# Patient Record
Sex: MALE | Race: WHITE
[De-identification: names, ages, dates, MRNs, and addresses within clinical notes are randomized per-mention and may not be internally consistent; named-entity substitution may affect disease eponyms.]

---

## 2018-08-13 ENCOUNTER — HOSPITAL ENCOUNTER (OUTPATIENT)
Dept: HOSPITAL 74 - JASU-SURG | Age: 48
Discharge: HOME | End: 2018-08-13
Attending: SURGERY
Payer: COMMERCIAL

## 2018-08-13 VITALS — SYSTOLIC BLOOD PRESSURE: 148 MMHG | HEART RATE: 67 BPM | DIASTOLIC BLOOD PRESSURE: 66 MMHG

## 2018-08-13 VITALS — BODY MASS INDEX: 30.2 KG/M2

## 2018-08-13 VITALS — TEMPERATURE: 98 F

## 2018-08-13 DIAGNOSIS — K42.9: Primary | ICD-10-CM

## 2018-08-13 PROCEDURE — 0WQF0ZZ REPAIR ABDOMINAL WALL, OPEN APPROACH: ICD-10-PCS | Performed by: SURGERY

## 2018-08-13 NOTE — HP
History & Physical Update





- History


History: No Change





- Physical


Physical: No Change





- Assessment


Assessment: No Change





- Plan


Plan: No Change (Recurrebt umbilical hernia for repair; r/b/t/a's d/w him pre-

op and informed consent obtained.)

## 2018-08-14 NOTE — PATH
Surgical Pathology Report



Patient Name:  CHRIS RASMUSSEN

Accession #:  [id number]

Med. Rec. #:  [id number]                                                        

   /Age/Gender:  1970 (Age: 48) / M

Account:  [id number]                                                          

             Location: [facility] SURGICAL

Taken:  2018

Received:  2018

Reported:  2018

Physicians:  Bubba Whiteside MD

  



Specimen(s) Received

 HERNIA SAC AND FAT 





Clinical History

Recurrent umbilical hernia







Final Diagnosis

HERNIA SAC AND FAT, REPAIR:

HERNIA SAC AND ADIPOSE TISSUE.





***Electronically Signed***

Gay De La Fuente M.D.





Gross Description

Received in formalin labeled "hernia sac and fat," is a 4.0 x 3.1 x 1.0 cm

portion of tan-pink fibromembranous tissue with attached fat, consistent with a

hernia sac. Representative sections are submitted in one cassette.

/2018

## 2018-08-22 NOTE — OP
DATE OF OPERATION:  08/13/2018

 

PREOPERATIVE DIAGNOSIS:  Recurrent umbilical hernia.

 

POSTOPERATIVE DIAGNOSIS:  Recurrent umbilical hernia.

 

PROCEDURE:  Repair recurrent umbilical hernia.

 

SURGEON:  Bubba Whiteside MD

 

ASSISTANT:  MANDO Gorman

 

ANESTHESIA:  General.

 

OPERATIVE FINDINGS:  There was a recurrent umbilical hernia with a fascial 
defect of

approximately 2.5 cm.  There was no evidence of any suture material from the 
previous

repair.  The rest of the findings were unremarkable.

 

PROCEDURE:  The patient was placed on the operating table in a supine position. 

After the induction of general anesthesia, patient's abdomen was prepped with

ChloraPrep and draped in sterile fashion.  A timeout was taken and the operative

field infiltrated with 1% xylocaine and 0.5% Marcaine in equal concentration. 

Infraumbilical skin incision crease was made from the 3 to 9 o'clock position 
using a

scalpel.  This was taken down through skin and subcutaneous tissue to the 
abdominal

wall.  The umbilical stalk was bluntly encircled, and the previously noted 
findings

were observed.  The umbilicus was dissected off the sac which was entered.  A

redundant sac and adherent preperitoneal fat were excised using electrocautery 
and

sent for pathological examination.  Hernia repair was carried out using multiple

interrupted 0 TiCron horizontal mattress sutures.  Irrigation was carried out 
with

normal saline, and then hemostasis checked for and noted to be good.  The 
operative

field was further infiltrated with additional local anesthesia, and then the

umbilicus was tacked down to the abdominal wall with interrupted 2-0 Vicryl.  
The

deep dermis was reapproximated with interrupted 3-0 Vicryl and the skin edges 
with

4-0 Monocryl in the subcuticular continuous fashion.  Steri-Strips, dry sterile

dressing, and a Tegaderm were placed and the procedure terminated at this 
point.  The

patient was aroused from general anesthesia and transferred to the 
postanesthesia

care unit in stable condition awake and alert.

 

ESTIMATED BLOOD LOSS:  Approximately 10 mL.

 

REPLACEMENTS:  Crystalloids.

 

DRAINS:  None.

 

SPECIMENS:  Hernia sac and fat to Pathology.

 

Bubba ZAVALA, was physically present in the operating room from the time 
the

patient was placed on the operating table until he was transferred to the

postanesthesia care unit in "[employer].

 

 

MD LAURA Haley/[id number]

DD: 08/22/2018 09:43

DT: 08/22/2018 11:21

Job #:  [id number]

MTDD

## 2020-08-14 ENCOUNTER — HOSPITAL ENCOUNTER (EMERGENCY)
Dept: HOSPITAL 74 - JER | Age: 50
Discharge: HOME | End: 2020-08-14
Payer: COMMERCIAL

## 2020-08-14 VITALS — SYSTOLIC BLOOD PRESSURE: 160 MMHG | DIASTOLIC BLOOD PRESSURE: 90 MMHG | HEART RATE: 82 BPM

## 2020-08-14 VITALS — BODY MASS INDEX: 30.9 KG/M2

## 2020-08-14 VITALS — TEMPERATURE: 97.7 F

## 2020-08-14 DIAGNOSIS — I10: Primary | ICD-10-CM

## 2020-08-14 LAB
ALBUMIN SERPL-MCNC: 4 G/DL (ref 3.4–5)
ALP SERPL-CCNC: 74 U/L (ref 45–117)
ALT SERPL-CCNC: 39 U/L (ref 13–61)
ANION GAP SERPL CALC-SCNC: 9 MMOL/L (ref 8–16)
AST SERPL-CCNC: 28 U/L (ref 15–37)
BASOPHILS # BLD: 0.5 % (ref 0–2)
BILIRUB SERPL-MCNC: 0.8 MG/DL (ref 0.2–1)
BUN SERPL-MCNC: 21.1 MG/DL (ref 7–18)
CALCIUM SERPL-MCNC: 9.3 MG/DL (ref 8.5–10.1)
CHLORIDE SERPL-SCNC: 105 MMOL/L (ref 98–107)
CO2 SERPL-SCNC: 25 MMOL/L (ref 21–32)
CREAT SERPL-MCNC: 1.5 MG/DL (ref 0.55–1.3)
DEPRECATED RDW RBC AUTO: 13.8 % (ref 11.9–15.9)
EOSINOPHIL # BLD: 0.6 % (ref 0–4.5)
GLUCOSE SERPL-MCNC: 98 MG/DL (ref 74–106)
HCT VFR BLD CALC: 49 % (ref 35.4–49)
HGB BLD-MCNC: 16.8 GM/DL (ref 11.7–16.9)
LYMPHOCYTES # BLD: 32.8 % (ref 8–40)
MCH RBC QN AUTO: 30.9 PG (ref 25.7–33.7)
MCHC RBC AUTO-ENTMCNC: 34.3 G/DL (ref 32–35.9)
MCV RBC: 90.1 FL (ref 80–96)
MONOCYTES # BLD AUTO: 8.7 % (ref 3.8–10.2)
NEUTROPHILS # BLD: 57.4 % (ref 42.8–82.8)
PLATELET # BLD AUTO: 164 K/MM3 (ref 134–434)
PMV BLD: 9.8 FL (ref 7.5–11.1)
POTASSIUM SERPLBLD-SCNC: 4.1 MMOL/L (ref 3.5–5.1)
PROT SERPL-MCNC: 7.6 G/DL (ref 6.4–8.2)
RBC # BLD AUTO: 5.44 M/MM3 (ref 4–5.6)
SODIUM SERPL-SCNC: 140 MMOL/L (ref 136–145)
WBC # BLD AUTO: 6.9 K/MM3 (ref 4–10)

## 2020-08-14 NOTE — PDOC
History of Present Illness





- General


Chief Complaint: Blood Pressure Problem


Stated Complaint: HIGH BLOOD PRESSURE


Time Seen by Provider: 08/14/20 12:46


History Source: Patient


Exam Limitations: No Limitations





- History of Present Illness


Initial Comments: 





08/14/20 12:56


50yM w PMHx HTN and umbilical hernia presenting w 10d persistent HTN and 

intermittent lightheadedness. Last few BPs at home 140//100. Current PCP 

told pt to take lisinopril in evening but didn't increase dose or add any meds 

despite repeated office visits w HTN. Trialed another medication, stopped d/t 

leg swelling and nausea side effects. Not compliant w home lisinopril. Denies 

fever, headache, vision change, n/v, chest pain, SOB. 





Not satisfied with PCP, wants new doc.





Past History





- Medical History


Allergies/Adverse Reactions: 


                                    Allergies











Allergy/AdvReac Type Severity Reaction Status Date / Time


 


fish derived Allergy  Hives Verified 08/13/18 10:02


 


No Known Drug Allergies Allergy   Verified 08/13/18 10:02


 


shellfish derived Allergy  Hives Verified 08/13/18 10:02











Home Medications: 


Ambulatory Orders





Lisinopril 10 mg PO DAILY 08/13/18 








Anemia: No


Asthma: No


Cancer: No


Cardiac Disorders: No


CVA: No


COPD: No


CHF: No


Dementia: No


Diabetes: No


GI Disorders: Yes (DIVERTICULITIS)


 Disorders: No


HTN: Yes


Hypercholesterolemia: No


Liver Disease: No


Seizures: No


Thyroid Disease: No





- Surgical History


Abdominal Surgery: Yes (LEFT HERNIA REPAIR 2X,UMBILICAL HERNIA)


Appendectomy: No


Cardiac Surgery: No


Cholecystectomy: No


Lung Surgery: No


Neurologic Surgery: No


Orthopedic Surgery: Yes (RIGHT KNEE SURGERY)





- Immunization History


Immunization Up to Date: Yes





- Psycho-Social/Smoking History


Smoking History: Never smoked


Have you smoked in the past 12 months: Yes


Number of Cigarettes Smoked Daily: 0


Cigars Per Day: 1


'Breaking Loose' booklet given: 09/07/16





- Substance Abuse Hx (Audit-C & DAST Scrn)


How often the patient has a drink containing alcohol: Monthly or less


Number of drinks the patient has on a typical day: 1 or 2


How often the patient has six or more drinks on one occasion: Never


Score: In Men: 4 or > Positive; In Women: 3 or > Positive: 1


Screen Result (Pos requires Nsg. Audit-10AR): Negative


In the last yr the pt used illegal drug/Rx for NonMed reason: No


Score:  Yes response is considered Positive: 0


Screen Result (Positive result requires Nsg. DAST-10): Negative





**Review of Systems





- Review of Systems


Constitutional: No: Chills, Fever


HEENTM: No: Eye Pain, Recent change in vision, Nose Congestion


Respiratory: No: Cough, Shortness of Breath


Cardiac (ROS): No: Chest Pain, Palpitations, Syncope


ABD/GI: No: Constipated, Diarrhea, Nausea, Vomiting


: No: Burning, Dysuria


Musculoskeletal: No: Back Pain, Joint Pain


Integumentary: No: Bruising, Flushing


Neurological: No: Headache, Seizure


Psychiatric: No: Anxiety, Depression


Endocrine: No: Intolerance to Cold, Intolerance to Heat


Hematologic/Lymphatic: No: Anemia, Blood Clots





*Physical Exam





- Vital Signs


                                Last Vital Signs











Temp Pulse Resp BP Pulse Ox


 


 97.7 F   89   20   197/112 H  100 


 


 08/14/20 12:41  08/14/20 12:41  08/14/20 12:41  08/14/20 12:41  08/14/20 12:41














- Physical Exam


General Appearance: Yes: Nourished, Appropriately Dressed.  No: Apparent 

Distress


HEENT: positive: EOMI, KATELYN, Normal Voice, Hearing Grossly Normal.  negative: S

cleral Icterus (R), Scleral Icterus (L)


Respiratory/Chest: positive: Lungs Clear, Normal Breath Sounds.  negative: Chest

Tender, Respiratory Distress, Crackles, Rales, Rhonchi, Stridor, Wheezing


Cardiovascular: positive: Regular Rhythm, Regular Rate, S1, S2.  negative: 

Edema, Murmur


Gastrointestinal/Abdominal: positive: Normal Bowel Sounds, Flat, Soft.  

negative: Tender, Organomegaly


Integumentary: positive: Normal Color, Warm.  negative: Dry


Neurologic: positive: Fully Oriented, Alert, Normal Mood/Affect, Normal 

Response, Responsive





ED Treatment Course





- LABORATORY


CBC & Chemistry Diagram: 


                                 08/14/20 13:37





                                 08/14/20 13:37





Medical Decision Making





- Medical Decision Making





08/14/20 12:56


EKG - NSR, HR 85, QTc 459, no ST changes





---





50yM w PMHx HTN and umbilical hernia presenting w 10d persistent HTN and 

intermittent lightheadedness. 


/112 likely 2/2 medication noncompliance. Labs nonconcerning, EKG no ST 

changes, Cr at baseline.





Given home lisinopril, repeat /100





DC home w PCP referral





Discharge





- Discharge Information


Problems reviewed: Yes


Clinical Impression/Diagnosis: 


Hypertension


Qualifiers:


 Hypertension type: essential hypertension Qualified Code(s): I10 - Essential 

(primary) hypertension





Condition: Improved


Disposition: HOME





- Follow up/Referral


Referrals: 


Rosendo Ward MD [Staff Physician] - 


Joey Hummel MD [Staff Physician] - 


Trang Germain MD [Staff Physician] - 


Ralph Coates MD [Staff Physician] - 





- Patient Discharge Instructions


Patient Printed Discharge Instructions:  DI for High Blood Pressure


Additional Instructions: 


Your workup did not show anything concerning





Avoid fatty/fried/salty foods


Drink lots of water





Please follow up with one of the referred primary care doctors





- Post Discharge Activity





Vital Signs





- Vital Signs


Blood Pressure: 175/117


BP Location: Left Arm

## 2020-08-14 NOTE — PDOC
Documentation entered by Cleo Wisdom SCRIBE, acting as scribe for Lakhwinder Giron MD.








Lakhwinder Giron MD:  This documentation has been prepared by the sherlyibeArtis Ana, SCRIBE, under my direction and personally reviewed by me in its entirety.  I 

confirm that the documentation accurately reflects all work, treatment, 

procedures, and medical decision making performed by me.  





Attending Attestation





- Resident


Resident Name: Cheng Waterman





- ED Attending Attestation


I have performed the following: I have examined & evaluated the patient, The 

case was reviewed & discussed with the resident, I agree w/resident's findings &

plan, Exceptions are as noted





- HPI


HPI: 





08/14/20 13:04


Patient is a 50 year old male with a significant past medical history of 

hypertension and umbilical hernia who presents to the ED with lightheadedness 

and persistent hypertension x10 days.  Lightheadedness is for a few seconds at 

time usually when he gets in the car to drive.  Does not feel like he is in a 

pass out.  Is not exertional.  There is no associated fever chills chest pain 

shortness of breath nausea vomiting diarrhea.





Patient denies: fever, headache, any vision changes, nausea, vomiting, SOB, 

chest pain, or any other related symptoms. 





Allergies: fish/shellfish derived 











08/14/20 14:27








- Physicial Exam


PE: 





08/14/20 14:27





Vitals: Triage Vital signs reviewed


General Appearance: No acute distress, well nourished well developed, 


Head: Atraumatic, 


Cardiac: Regular rate and rhythym, no murmurs, no rubs, no gallops, 


Lungs: Clear to auscultation bilateral, good air movement bilaterally,


Abdomen: Soft, non distended, normal bowel sounds, non tender to palpation


Extremities: Full range of motion to all extremities, no cyanosis, clubbing, or 

edema


Skin: Warm and dry, no rashes or lesions, no rash, no petechiae


Neuro: AOX3; cranial Nerves 2-12 grossly intact, strength intact to all 

extremities, sensation intact to all extremities, gait normal


Psych: Normal mood, normal affect





- Medical Decision Making





08/14/20 15:50


Well-appearing no apparent distress with intermittent lightheadedness concerned 

about his blood pressure.  On further history patient has only been 

intermittently taking his blood pressure medication.  After taking his home dose

his blood pressure has normalized to 160/100 he has no complaints at this time





His EKG demonstrates no ST elevations or T wave inversions his laboratory 

analysis is unremarkable his first troponin is negative.





We have arranged for patient to follow-up in our clinic for further management 

of his chronic hypertension





Findings, need for follow-up and strict return instructions discussed with 

patient.





**Heart Score/ECG Review





- ECG Impressions


Comment:: 





08/14/20 14:24


 EKG performed at 1401 demonstrates normal sinus rhythm no ST elevations or T 

wave inversions





Interpreted by me.





Discharge





- Discharge Information


Problems reviewed: Yes


Clinical Impression/Diagnosis: 


Hypertension


Qualifiers:


 Hypertension type: essential hypertension Qualified Code(s): I10 - Essential 

(primary) hypertension





Condition: Improved


Disposition: HOME





- Follow up/Referral


Referrals: 


Rosendo Ward MD [Staff Physician] - 


Joey Hummel MD [Staff Physician] - 


Trang Germain MD [Staff Physician] - 


Ralph Coates MD [Staff Physician] - 





- Patient Discharge Instructions


Patient Printed Discharge Instructions:  DI for High Blood Pressure


Additional Instructions: 


Your workup did not show anything concerning





Avoid fatty/fried/salty foods


Drink lots of water





Please follow up with one of the referred primary care doctors





- Post Discharge Activity

## 2020-08-16 NOTE — EKG
Test Reason : 

Blood Pressure : ***/*** mmHG

Vent. Rate : 085 BPM     Atrial Rate : 085 BPM

   P-R Int : 128 ms          QRS Dur : 074 ms

    QT Int : 386 ms       P-R-T Axes : 037 005 032 degrees

   QTc Int : 459 ms

 

NORMAL SINUS RHYTHM WITH SINUS ARRHYTHMIA

LEFT ATRIAL ENLARGEMENT

LEFT VENTRICULAR HYPERTROPHY

NONSPECIFIC ST ABNORMALITY

ABNORMAL ECG

Confirmed by MD NANETTE, DEEPA ([id number]) on 8/16/2020 6:42:56 PM

 

Referred By:             Confirmed By:DEEPA FITZPATRICK MD

## 2023-10-19 ENCOUNTER — TELEPHONE (OUTPATIENT)
Age: 53
End: 2023-10-19

## 2023-10-19 NOTE — TELEPHONE ENCOUNTER
Patient called stating he received a text in regards to his insurance . He has [employer] /blue shield insurance  Patient is the subscriber of the insurance  Id [id number]  effective 08/01/2023   Group number [id number]    [facility].      Patient can be reached at [phone]

## 2023-10-23 ENCOUNTER — OFFICE VISIT (OUTPATIENT)
Dept: UROLOGY | Facility: MEDICAL CENTER | Age: 53
End: 2023-10-23
Payer: COMMERCIAL

## 2023-10-23 VITALS
HEART RATE: 65 BPM | WEIGHT: 216 LBS | HEIGHT: 74 IN | OXYGEN SATURATION: 96 % | SYSTOLIC BLOOD PRESSURE: 138 MMHG | BODY MASS INDEX: 27.72 KG/M2 | DIASTOLIC BLOOD PRESSURE: 98 MMHG

## 2023-10-23 DIAGNOSIS — Z30.09 STERILIZATION CONSULT: ICD-10-CM

## 2023-10-23 DIAGNOSIS — Z12.5 PROSTATE CANCER SCREENING: Primary | ICD-10-CM

## 2023-10-23 LAB
SL AMB  POCT GLUCOSE, UA: ABNORMAL
SL AMB LEUKOCYTE ESTERASE,UA: ABNORMAL
SL AMB POCT BILIRUBIN,UA: ABNORMAL
SL AMB POCT BLOOD,UA: ABNORMAL
SL AMB POCT CLARITY,UA: CLEAR
SL AMB POCT COLOR,UA: YELLOW
SL AMB POCT KETONES,UA: ABNORMAL
SL AMB POCT NITRITE,UA: ABNORMAL
SL AMB POCT PH,UA: 5.5
SL AMB POCT SPECIFIC GRAVITY,UA: 1.01
SL AMB POCT URINE PROTEIN: ABNORMAL
SL AMB POCT UROBILINOGEN: 0.2

## 2023-10-23 PROCEDURE — 81003 URINALYSIS AUTO W/O SCOPE: CPT | Performed by: UROLOGY

## 2023-10-23 PROCEDURE — 99204 OFFICE O/P NEW MOD 45 MIN: CPT | Performed by: UROLOGY

## 2023-10-23 RX ORDER — ALPRAZOLAM 1 MG/1
TABLET ORAL
Qty: 1 TABLET | Refills: 0 | Status: SHIPPED | OUTPATIENT
Start: 2023-10-23

## 2023-10-23 RX ORDER — LISINOPRIL 20 MG/1
TABLET ORAL
COMMUNITY
Start: 2023-10-06

## 2023-10-23 NOTE — PROGRESS NOTES
HISTORY:    1.  Prostate cancer screening    PSA 1.7 in October 2022    He sees nephrology, and patient says nephrology told him there was something abnormal on an ultrasound, perhaps distended bladder. 2.  Vasectomy consultation           This patient has 3 children, 3who is 3year-old, and would like to have a vasectomy. He and his wife or partner are sure they want no more children, and are aware that vasectomy is intended to be a permanent form of sterilization. He has been told and understands the following: We will order a semen analysis to check for sterility after three months, and that he must use protection during that time. No guarantee can be made of permanent sterility, and that failure of the procedure is not common, but can occur. Furthermore, spontaneous reestablishment of the flow sperm is quite rare but is a possible reason for failure of the procedure. Although it is not mandatory, if he wants to request another semen sample at any time in the future, to ensure continued sterility, he can do so, at his decision. Potential complications of the procedure include, but are not limited to:  Excessive bleeding which can cause significant swelling; infections; chronic lingering pain of the testicle; damage to the blood supply of the testicle, which might lead to testicular atrophy. The patient has told me he understands the above statements, and wishes to proceed with scheduling the vasectomy. ASSESSMENT / PLAN:    1. He will get the ultrasound report for me to review, it was done elsewhere in [facility]    2. Check PSA again    3. Vasectomy schedule we will prescribe 1 mg Xanax.     The following portions of the patient's history were reviewed and updated as appropriate: allergies, current medications, past family history, past medical history, past social history, past surgical history, and problem list.    Review of Systems      Objective: Physical Exam  Genitourinary:     Comments: Penis testes normal    Prostate minimally large no nodule          No results found for: "PSA"]  No results found for: "BUN"  No results found for: "CREATININE"  No components found for: "CBC"      There is no problem list on file for this patient. Diagnoses and all orders for this visit:    Prostate cancer screening  -     PSA Total, Diagnostic; Future  -     POCT urine dip auto non-scope    Sterilization consult  -     Vasectomy; Future  -     ALPRAZolam (XANAX) 1 mg tablet; 1-2 hr before procedure  -     POCT urine dip auto non-scope    Other orders  -     lisinopril (ZESTRIL) 20 mg tablet; TAKE 1 TABLET BY MOUTH EVERY DAY FOR HIGH BLOOD PRESSURE           Patient ID: Maria L Edge is a 52 y.o. male. No current outpatient medications on file. No past medical history on file. No past surgical history on file.     Social History

## 2023-10-23 NOTE — PATIENT INSTRUCTIONS
If your kidney doctor did a ultrasound, also called sonogram, of the kidneys and bladder, please get that report and bring it to our office at the time of the vasectomy.

## 2024-01-10 ENCOUNTER — TELEPHONE (OUTPATIENT)
Age: 54
End: 2024-01-10

## 2024-01-10 NOTE — TELEPHONE ENCOUNTER
Patient called to confirm his vasectomy appointment on 02/23/24 at 930 am with Dr. Martinez.  He also wanted the fax number for the office. To have his pcp fax over his psa results.

## 2024-01-26 ENCOUNTER — PROCEDURE VISIT (OUTPATIENT)
Dept: UROLOGY | Facility: MEDICAL CENTER | Age: 54
End: 2024-01-26
Payer: COMMERCIAL

## 2024-01-26 VITALS
OXYGEN SATURATION: 98 % | HEART RATE: 95 BPM | SYSTOLIC BLOOD PRESSURE: 110 MMHG | DIASTOLIC BLOOD PRESSURE: 70 MMHG | HEIGHT: 74 IN | BODY MASS INDEX: 26.95 KG/M2 | WEIGHT: 210 LBS

## 2024-01-26 DIAGNOSIS — Z30.2 ENCOUNTER FOR VASECTOMY: Primary | ICD-10-CM

## 2024-01-26 PROCEDURE — 55250 REMOVAL OF SPERM DUCT(S): CPT | Performed by: UROLOGY

## 2024-01-26 PROCEDURE — 88302 TISSUE EXAM BY PATHOLOGIST: CPT | Performed by: PATHOLOGY

## 2024-01-26 RX ORDER — ALLOPURINOL 100 MG/1
100 TABLET ORAL DAILY
COMMUNITY
Start: 2023-12-28

## 2024-01-26 NOTE — PROGRESS NOTES
Patient presents for vasectomy.  He had a consultation recently, all questions were answered, potential complications of the procedure were discussed.  Patient expressed understanding of everything we talked about, and signed informed consent document.      Before the procedure today,  I asked the patient if he  had any further questions that were not answered during the vasectomy consult.  He had no questions and gave informed consent for the procedure.    The patient was placed in lithotomy position and the scrotum was prepped with betadine solution.  I ensured he was comfortable in lithotomy position, and sterile drapes were placed.       The left scrotum was palpated and vas identified.  The left vas was isolated, and 8-10 cc of 1% lidocaine was instilled in the skin above the vas, in the dartos muscle and the tissue around the vas.  I checked for sensation and the left side was properly anesthetized.  A small incision was made over the vas, and the tissue surrounding the vas was gently dissected with hemostat to further isolate the vas.  An allis clamp was placed around the vas, and it was brought up into the incision.  A small incision in the tissue immediately surrounding the vas was made,and  a small loop of vas was  from the surrounding tissue with a hemostat.  A small segment of vas was removed with scissors.  The inner lumen of each end of the cut vas was cauterized with bovie to scar the lumen.  Each end of the vas was sealed with a hemoclip, taking care not to place the clip too hard, to prevent the clipped end from sloughing off.  I checked for bleeding very carefully, used gentle bovie cautery as needed.  I was careful not to disturb the arterial supply to the vas or the testicle.  Inspection showed no significant bleeding.  The vas and surrounding tissue were gently placed back into the scrotum.    The right sided vasectomy was performed in identical fashion as in the above paragraph.   After removing the segment of vas, cauterizing the lumen, clipping the ends, I checked carefully for bleeding, and there was none.     Each incision was closed with a running suture of 4-0 chromic.  There was no significant skin bleeding.  A sterile dressing was applied and the patient's underwear held the bandage snugly.      He was carefully raised to a sitting position and observed to ensure no orthostasis or dizziness.  He was properly recovered from the procedure.  I reviewed the post-op instructions again and gave him a copy of the instructions in writing.    I again emphasized the importance of using birth control until a negative semen sample was obtained at 3 months.   Before that he cannot  be considered infertile.  He knows his partner must use birth control until that negative sample.  He also knows there are rare cases of spontaneous reversal of the vasectomy, and absolutely no guarantee can be made of lifelong infertility. He knows that he can request another semen sample at anytime in the future if he wants further reassurance, although it is not necessary.   He was escorted to the waiting room, and his  knew to take him right home.       Before he left the procedure room I reviewed all the post-op instructions with him, and his said he understood them.     He knows to keep the bandage on until the next day, and he can shower then.  He knows to limit activities for several days and call us immediately if he has any concerns for bleeding, pain, infection, or any complication.     The patient tolerated the procedure well and understood all instructions and exited the exam room in good condition.

## 2024-02-05 ENCOUNTER — NURSE TRIAGE (OUTPATIENT)
Age: 54
End: 2024-02-05

## 2024-02-05 NOTE — TELEPHONE ENCOUNTER
Pt had vas on 01/26 with Dr. Martinez in [place]    Pt called office reports one episode of blood in semen. Reports this was the first time since his procedure. Pt denies any other issues no pain, discomfort or issues with urination. Advised patient to continue to monitor. Advised to call office if continues or any change in symptoms. Pt states understanding.

## 2024-02-28 ENCOUNTER — OFFICE VISIT (OUTPATIENT)
Dept: URGENT CARE | Facility: CLINIC | Age: 54
End: 2024-02-28
Payer: COMMERCIAL

## 2024-02-28 VITALS
HEART RATE: 78 BPM | DIASTOLIC BLOOD PRESSURE: 86 MMHG | RESPIRATION RATE: 18 BRPM | TEMPERATURE: 98.6 F | SYSTOLIC BLOOD PRESSURE: 137 MMHG | OXYGEN SATURATION: 98 %

## 2024-02-28 DIAGNOSIS — H10.31 ACUTE CONJUNCTIVITIS OF RIGHT EYE, UNSPECIFIED ACUTE CONJUNCTIVITIS TYPE: Primary | ICD-10-CM

## 2024-02-28 PROBLEM — I10 HIGH BLOOD PRESSURE: Status: ACTIVE | Noted: 2024-02-28

## 2024-02-28 PROCEDURE — 99213 OFFICE O/P EST LOW 20 MIN: CPT

## 2024-02-28 RX ORDER — POLYMYXIN B SULFATE AND TRIMETHOPRIM 1; 10000 MG/ML; [USP'U]/ML
1 SOLUTION OPHTHALMIC EVERY 4 HOURS
Qty: 10 ML | Refills: 0 | Status: SHIPPED | OUTPATIENT
Start: 2024-02-28

## 2024-02-28 NOTE — PATIENT INSTRUCTIONS
Use eye drops in both eyes as prescribed.  This will reduce the chance the microbes will spread to the other eye.    Follow up with Primary Care Provider in 3-5 days if not improving.  Proceed to Emergency Department if symptoms worsen.

## 2024-02-28 NOTE — PROGRESS NOTES
[facility]        NAME: Daljit Kent is a 54 y.o. male  : 1970    MRN: [id number]  DATE: 2024  TIME: 2:43 PM    Assessment and Plan   Acute conjunctivitis of right eye, unspecified acute conjunctivitis type [H10.31]  1. Acute conjunctivitis of right eye, unspecified acute conjunctivitis type  polymyxin b-trimethoprim (POLYTRIM) ophthalmic solution            Patient Instructions   Use eye drops in both eyes as prescribed.  This will reduce the chance the microbes will spread to the other eye.    Follow up with Primary Care Provider in 3-5 days if not improving.  Proceed to Emergency Department if symptoms worsen.    Chief Complaint     Chief Complaint   Patient presents with   • Conjunctivitis     Right eye, started yesterday          History of Present Illness       Right eye redness and drainage starting yesterday. Son with similar symptoms starting several days before that. He is retired and takes care of his son at home (no ). No injuries that he can think of.        Review of Systems   Review of Systems   Constitutional:  Negative for chills and fever.   HENT:  Negative for congestion.    Eyes:  Positive for pain, discharge and redness. Negative for itching.   Respiratory:  Negative for cough and shortness of breath.    Neurological:  Negative for dizziness and headaches.         Current Medications       Current Outpatient Medications:   •  allopurinol (ZYLOPRIM) 100 mg tablet, Take 100 mg by mouth daily, Disp: , Rfl:   •  lisinopril (ZESTRIL) 20 mg tablet, TAKE 1 TABLET BY MOUTH EVERY DAY FOR HIGH BLOOD PRESSURE, Disp: , Rfl:   •  polymyxin b-trimethoprim (POLYTRIM) ophthalmic solution, Administer 1 drop to both eyes every 4 (four) hours For 7 days, Disp: 10 mL, Rfl: 0  •  ALPRAZolam (XANAX) 1 mg tablet, 1-2 hr before procedure (Patient not taking: Reported on 2024), Disp: 1 tablet, Rfl: 0    Current Allergies     Allergies as of 2024 - Reviewed 2024    Allergen Reaction Noted   • Shellfish-derived products - food allergy Hives 07/22/2022            The following portions of the patient's history were reviewed and updated as appropriate: allergies, current medications, past family history, past medical history, past social history, past surgical history and problem list.     Past Medical History:   Diagnosis Date   • Hypertension        Past Surgical History:   Procedure Laterality Date   • HERNIA REPAIR Right     MESH PUT IN   • KNEE SURGERY Right    • VASECTOMY Bilateral 01/26/2024    sterilization       Family History   Problem Relation Age of Onset   • Hypertension Mother          Medications have been verified.        Objective   /86   Pulse 78   Temp 98.6 °F (37 °C)   Resp 18   SpO2 98%   No LMP for male patient.       Physical Exam     Physical Exam  Vitals and nursing note reviewed.   Constitutional:       Appearance: Normal appearance.   HENT:      Head: Normocephalic and atraumatic.      Right Ear: Tympanic membrane normal.      Left Ear: Tympanic membrane normal.   Eyes:      General:         Right eye: Discharge present.         Left eye: No discharge.      Conjunctiva/sclera:      Right eye: Right conjunctiva is injected.      Left eye: Left conjunctiva is not injected.   Pulmonary:      Effort: Pulmonary effort is normal.   Skin:     General: Skin is warm and dry.      Capillary Refill: Capillary refill takes less than 2 seconds.   Neurological:      General: No focal deficit present.      Mental Status: He is alert and oriented to person, place, and time. Mental status is at baseline.      Sensory: No sensory deficit.      Motor: No weakness.   Psychiatric:         Mood and Affect: Mood normal.         Behavior: Behavior normal.         Thought Content: Thought content normal.

## 2024-03-20 ENCOUNTER — APPOINTMENT (EMERGENCY)
Dept: RADIOLOGY | Facility: HOSPITAL | Age: 54
End: 2024-03-20
Payer: COMMERCIAL

## 2024-03-20 ENCOUNTER — HOSPITAL ENCOUNTER (EMERGENCY)
Facility: HOSPITAL | Age: 54
Discharge: HOME | End: 2024-03-20
Attending: EMERGENCY MEDICINE
Payer: COMMERCIAL

## 2024-03-20 VITALS
SYSTOLIC BLOOD PRESSURE: 152 MMHG | HEART RATE: 80 BPM | TEMPERATURE: 98.3 F | OXYGEN SATURATION: 97 % | WEIGHT: 218.92 LBS | RESPIRATION RATE: 18 BRPM | DIASTOLIC BLOOD PRESSURE: 86 MMHG | BODY MASS INDEX: 28.11 KG/M2

## 2024-03-20 DIAGNOSIS — M62.838 TRAPEZIUS MUSCLE SPASM: ICD-10-CM

## 2024-03-20 DIAGNOSIS — M47.812 SPONDYLOSIS OF CERVICAL SPINE: Primary | ICD-10-CM

## 2024-03-20 PROCEDURE — 72040 X-RAY EXAM NECK SPINE 2-3 VW: CPT

## 2024-03-20 PROCEDURE — 76536 US EXAM OF HEAD AND NECK: CPT

## 2024-03-20 PROCEDURE — 99283 EMERGENCY DEPT VISIT LOW MDM: CPT

## 2024-03-20 PROCEDURE — 99284 EMERGENCY DEPT VISIT MOD MDM: CPT

## 2024-03-20 RX ORDER — DIAZEPAM 5 MG/1
5 TABLET ORAL ONCE
Status: COMPLETED | OUTPATIENT
Start: 2024-03-20 | End: 2024-03-20

## 2024-03-20 RX ORDER — ACETAMINOPHEN 500 MG
500 TABLET ORAL EVERY 6 HOURS PRN
Qty: 20 TABLET | Refills: 0 | Status: SHIPPED | OUTPATIENT
Start: 2024-03-20

## 2024-03-20 RX ORDER — PREDNISONE 20 MG/1
20 TABLET ORAL DAILY
Qty: 5 TABLET | Refills: 0 | Status: SHIPPED | OUTPATIENT
Start: 2024-03-20 | End: 2024-03-25

## 2024-03-20 RX ORDER — METHOCARBAMOL 500 MG/1
500 TABLET, FILM COATED ORAL 2 TIMES DAILY
Qty: 20 TABLET | Refills: 0 | Status: SHIPPED | OUTPATIENT
Start: 2024-03-20

## 2024-03-20 RX ORDER — ACETAMINOPHEN 325 MG/1
975 TABLET ORAL ONCE
Status: COMPLETED | OUTPATIENT
Start: 2024-03-20 | End: 2024-03-20

## 2024-03-20 RX ADMIN — DIAZEPAM 5 MG: 5 TABLET ORAL at 16:37

## 2024-03-20 RX ADMIN — ACETAMINOPHEN 325MG 975 MG: 325 TABLET ORAL at 16:37

## 2024-03-20 NOTE — DISCHARGE INSTRUCTIONS
Patient advised to follow-up PCP.  ED visit.  Patient advised to return to the ED with any worsening symptoms that were explained on discharge.

## 2024-03-20 NOTE — Clinical Note
Daljit Kent was seen and treated in our emergency department on 3/20/2024.    ?    ?    ?    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    He may return on this date: 03/25/2024    ?     If you have any questions or concerns, please don't hesitate to call.      Francisco Wade PA-C    ______________________________           _______________          _______________  Hospital Representative                              Date                                Time

## 2024-03-20 NOTE — ED PROVIDER NOTES
History  Chief Complaint   Patient presents with    Neck Pain     Pt started with upper posterior neck pain x1 week. No relief with tylenol.      Patient is a 54-year-old male presented ED with chief complaint of neck pain.  States that this is been going on for 1 week.  He locates the pain on the lateral aspect of his cervical spine.  She states that the pain radiates into his right trapezius area.  Patient rates the pain a 5 out of 10 and states that it is intermittent in nature.  States that he always has a little bit of tightness in the area.  He denies any excessive activity within the last week.  He denies any changes in his activity throughout the last week.  Patient does note that he has a lipoma that he has had in the area for around 5 years.  He denies any changes to the lipoma.  Patient states he has been taking Tylenol at home with minimal relief.  He states that he has some pain with rotation of his neck and lateral flexion with his neck.  He describes the pain as a tight sensation.  He denies any associated headaches, vision changes, numbness tingling.Patient denies any chest pain, shortness of breath, vomiting, diarrhea, chills, diaphoresis, fevers, loss of consciousness, syncope, urinary and bowel changes, abdominal pain, visual symptoms, vision loss, loss of function, loss of sensation, decreased oral intake, hemoptysis, hematochezia, hematemesis, melena, confusion.         Prior to Admission Medications   Prescriptions Last Dose Informant Patient Reported? Taking?   ALPRAZolam (XANAX) 1 mg tablet   No No   Si-2 hr before procedure   Patient not taking: Reported on 2024   allopurinol (ZYLOPRIM) 100 mg tablet   Yes No   Sig: Take 100 mg by mouth daily   lisinopril (ZESTRIL) 20 mg tablet   Yes No   Sig: TAKE 1 TABLET BY MOUTH EVERY DAY FOR HIGH BLOOD PRESSURE   polymyxin b-trimethoprim (POLYTRIM) ophthalmic solution   No No   Sig: Administer 1 drop to both eyes every 4 (four) hours For 7 days       Facility-Administered Medications: None       Past Medical History:   Diagnosis Date    Hypertension     Kidney failure     stage 3 kidney failure per pt       Past Surgical History:   Procedure Laterality Date    HERNIA REPAIR Right     MESH PUT IN    KNEE SURGERY Right     VASECTOMY Bilateral 01/26/2024    sterilization       Family History   Problem Relation Age of Onset    Hypertension Mother      I have reviewed and agree with the history as documented.    E-Cigarette/Vaping    E-Cigarette Use Never User      E-Cigarette/Vaping Substances    Nicotine No     THC No     CBD No     Flavoring No     Other No     Unknown No      Social History     Tobacco Use    Smoking status: Former     Types: Cigarettes    Smokeless tobacco: Never    Tobacco comments:     CIGARS   Vaping Use    Vaping status: Never Used   Substance Use Topics    Alcohol use: Yes     Comment: OCASSIONALY    Drug use: Never       Review of Systems   Constitutional:  Negative for chills, diaphoresis, fatigue and fever.   HENT:  Negative for congestion, ear discharge, ear pain, postnasal drip, rhinorrhea, sinus pressure, sinus pain and sore throat.    Eyes:  Negative for photophobia and visual disturbance.   Respiratory:  Negative for cough, chest tightness and shortness of breath.    Cardiovascular:  Negative for chest pain and palpitations.   Gastrointestinal:  Negative for abdominal distention, abdominal pain, constipation, diarrhea, nausea and vomiting.   Genitourinary:  Negative for difficulty urinating, dysuria, flank pain, frequency and hematuria.   Musculoskeletal:  Positive for arthralgias, neck pain and neck stiffness. Negative for back pain, joint swelling and myalgias.   Skin:  Negative for rash and wound.   Neurological:  Positive for speech difficulty. Negative for dizziness, tremors, syncope, facial asymmetry, weakness, light-headedness, numbness and headaches.       Physical Exam  Physical Exam  Vitals and nursing note reviewed.    Constitutional:       General: He is not in acute distress.     Appearance: Normal appearance. He is normal weight. He is not ill-appearing, toxic-appearing or diaphoretic.   HENT:      Head: Normocephalic.      Right Ear: External ear normal.      Left Ear: External ear normal.      Nose: Nose normal.      Mouth/Throat:      Mouth: Mucous membranes are moist.   Eyes:      Conjunctiva/sclera: Conjunctivae normal.   Cardiovascular:      Rate and Rhythm: Normal rate and regular rhythm.      Pulses: Normal pulses.      Heart sounds: Normal heart sounds.   Pulmonary:      Effort: Pulmonary effort is normal.      Breath sounds: Normal breath sounds.   Abdominal:      General: Bowel sounds are normal.      Palpations: Abdomen is soft.      Tenderness: There is no abdominal tenderness.   Musculoskeletal:         General: Tenderness present. Normal range of motion.      Cervical back: Normal range of motion.      Comments: Tenderness to palpation over the lateral aspect of the cervical spine.  Pain is pinpointed to C4-C5 area.  Patient has tenderness that radiates into the right trapezius.  Pain with lateral and forward flexion of his hand.  No pain with rotation of the neck.  No axial pressure to pain.  No erythema ecchymosis noted.  There is a  lipoma just left to the area of tenderness.  Patient does have some minor pain with abduction of his right arm.  Also some minor pain with shoulder shrug.  Likely trapezius spasm.   Skin:     General: Skin is warm and dry.      Capillary Refill: Capillary refill takes less than 2 seconds.      Findings: No rash.   Neurological:      General: No focal deficit present.      Mental Status: He is alert and oriented to person, place, and time.      Cranial Nerves: No cranial nerve deficit.   Psychiatric:         Mood and Affect: Mood normal.         Vital Signs  ED Triage Vitals [03/20/24 1604]   Temperature Pulse Respirations Blood Pressure SpO2   98.3 °F (36.8 °C) 80 18 152/86 97 %       Temp Source Heart Rate Source Patient Position - Orthostatic VS BP Location FiO2 (%)   Oral Monitor Sitting Right arm --      Pain Score       1           Vitals:    03/20/24 1604   BP: 152/86   Pulse: 80   Patient Position - Orthostatic VS: Sitting         Visual Acuity      ED Medications  Medications   acetaminophen (TYLENOL) tablet 975 mg (975 mg Oral Given 3/20/24 1637)   diazepam (VALIUM) tablet 5 mg (5 mg Oral Given 3/20/24 1637)       Diagnostic Studies  Results Reviewed       None                   XR cervical spine 2 or 3 views   ED Interpretation by Francisco Wade PA-C (03/20 1729)   No acute osseous abnormalities                 Procedures  POC MSK/Soft Tissue US    Date/Time: 3/20/2024 11:44 PM    Performed by: Francisco Wade PA-C  Authorized by: Francisco Wade PA-C    Patient location:  ED  Performed by:  NP/PA  Other Assisting Provider: No    Procedure:     Performed: soft tissue ultrasound    Procedure details:     Exam Type:  Diagnostic    Image quality: diagnostic      Image availability:  Still images obtained  Soft tissue ultrasound:     Soft tissue indications comment:  Lipoma evaluation    Anatomic location:  Head/neck    Soft tissue findings: subcutaneous collection (comment)      Collection size (cm):  2           ED Course  ED Course as of 03/20/24 2353   Wed Mar 20, 2024   1628 MSK on exam                                SBIRT 20yo+      Flowsheet Row Most Recent Value   Initial Alcohol Screen: US AUDIT-C     1. How often do you have a drink containing alcohol? 0 Filed at: 03/20/2024 1623   2. How many drinks containing alcohol do you have on a typical day you are drinking?  0 Filed at: 03/20/2024 1623   3a. Male UNDER 65: How often do you have five or more drinks on one occasion? 0 Filed at: 03/20/2024 1623   Audit-C Score 0 Filed at: 03/20/2024 1623   [place]: How many times in the past year have you...    Used an illegal drug or used a prescription medication for non-medical  reasons? Never Filed at: 03/20/2024 1623                      Medical Decision Making  Patient is a 54-year-old male presented to ED with chief complaint of neck pain.  Cardiopulmonary exam benign.  No carotid bruits on exam.  Range of motion of the neck normal.  Patient does have pain with lateral flexion bilateral sides along with rotation of the neck.  Mild in nature.  There is a 2 cm lipoma on the cervical spine around the area of C4-C5.  Patient has tenderness palpation around 2 cm right over the lipoma.  This tenderness expands into the right trapezius area.  Pain most consistent with trapezius muscle spasm/Levator scapulae tendinitis.  X-ray of cervical spine shows degenerative changes throughout most consistent with spondylosis of the cervical spine.  No acute osseous abnormalities noted.  Patient responded well to Valium and Tylenol in ED. ambulatory referral to comprehensive spine made.  I explained to the patient that physical therapy was probably the best option at this time, with no acute abnormalities noted.  Robaxin and 5-day course of prednisone sent.  I explained this sedative effects of Robaxin to the patient.Patient understood diagnosis and treatment plan and had no further questions.  Patient was discharged in stable condition.  Patient was advised to follow-up with her PCP in 1 to 2 days.  Patient was advised to return to the ED with any worsening symptoms that were explained on discharge including but not limited to chest pain, shortness of breath, irretractable vomiting or diarrhea, vision loss, loss of function, loss of sensation, syncope, hemoptysis, hematochezia, hematemesis, melena, decreased oral intake, feeling ill.  ultrasound of lipoma was performed in the ED.    Ddx-trapezius muscle spasm, spondylosis cervical spine, cervical neck strain, fracture, muscle strain, ligament injury,    Portions of the record may have been created with voice recognition software. Occasional wrong word or  "\"sound a like\" substitutions may have occurred due to the inherent limitations of voice recognition software. Read the chart carefully and recognize, using context, where substitutions have occurred.          Amount and/or Complexity of Data Reviewed  Radiology: ordered and independent interpretation performed. Decision-making details documented in ED Course.     Details: No acute osseous abnormalities    Risk  OTC drugs.  Prescription drug management.             Disposition  Final diagnoses:   Spondylosis of cervical spine   Trapezius muscle spasm     Time reflects when diagnosis was documented in both MDM as applicable and the Disposition within this note       Time User Action Codes Description Comment    3/20/2024  5:19 PM Francisco Wade [M47.812] Spondylosis of cervical spine     3/20/2024  5:19 PM Francisco Wade [M62.838] Trapezius muscle spasm           ED Disposition       ED Disposition   Discharge    Condition   Stable    Date/Time   Wed Mar 20, 2024 1719    Comment   Daljit Kent discharge to home/self care.                   Follow-up Information       Follow up With Specialties Details Why Contact Info Additional Information    [facility] Emergency Department Emergency Medicine   [address] [address]  [phone] [facility] Emergency Department, [address] [place], [place], [address]    [address] [address] [phone] [address], [address] [phone]            Discharge Medication List as of 3/20/2024  5:39 PM        START taking these medications    Details   acetaminophen (TYLENOL) 500 mg tablet Take 1 tablet (500 mg total) by mouth every 6 (six) hours as needed for mild pain or moderate pain, Starting Wed 3/20/2024, Normal "      methocarbamol (ROBAXIN) 500 mg tablet Take 1 tablet (500 mg total) by mouth 2 (two) times a day, Starting Wed 3/20/2024, Normal      predniSONE 20 mg tablet Take 1 tablet (20 mg total) by mouth daily for 5 days, Starting Wed 3/20/2024, Until Mon 3/25/2024, Normal           CONTINUE these medications which have NOT CHANGED    Details   allopurinol (ZYLOPRIM) 100 mg tablet Take 100 mg by mouth daily, Starting Thu 12/28/2023, Historical Med      ALPRAZolam (XANAX) 1 mg tablet 1-2 hr before procedure, Normal      lisinopril (ZESTRIL) 20 mg tablet TAKE 1 TABLET BY MOUTH EVERY DAY FOR HIGH BLOOD PRESSURE, Historical Med      polymyxin b-trimethoprim (POLYTRIM) ophthalmic solution Administer 1 drop to both eyes every 4 (four) hours For 7 days, Starting Wed 2/28/2024, Normal                 PDMP Review       None            ED Provider  Electronically Signed by             Francisco Wade PA-C  03/20/24 8648

## 2024-03-20 NOTE — Clinical Note
Daljit Kent was seen and treated in our emergency department on 3/20/2024.                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    He may return on this date: 03/25/2024         If you have any questions or concerns, please don't hesitate to call.      Francisco Wade PA-C    ______________________________           _______________          _______________  Hospital Representative                              Date                                Time

## 2024-04-03 ENCOUNTER — APPOINTMENT (OUTPATIENT)
Dept: LAB | Facility: HOSPITAL | Age: 54
End: 2024-04-03
Attending: UROLOGY
Payer: COMMERCIAL

## 2024-04-03 DIAGNOSIS — Z30.2 ENCOUNTER FOR VASECTOMY: ICD-10-CM

## 2024-04-03 LAB
DEPRECATED CD4 CELLS/CD8 CELLS BLD: 4 ML
SPERM MOTILE SMN QL MICRO: ABNORMAL

## 2024-04-03 PROCEDURE — 89321 SEMEN ANAL SPERM DETECTION: CPT
